# Patient Record
Sex: MALE | Race: WHITE | NOT HISPANIC OR LATINO | URBAN - METROPOLITAN AREA
[De-identification: names, ages, dates, MRNs, and addresses within clinical notes are randomized per-mention and may not be internally consistent; named-entity substitution may affect disease eponyms.]

---
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
## 2024-09-27 PROBLEM — S83.281A ACUTE LATERAL MENISCUS TEAR OF RIGHT KNEE: Status: ACTIVE | Noted: 2024-09-27

## 2025-03-05 ENCOUNTER — OFFICE VISIT (OUTPATIENT)
Dept: MEDICAL GROUP | Facility: LAB | Age: 20
End: 2025-03-05
Payer: COMMERCIAL

## 2025-03-05 VITALS
TEMPERATURE: 97.9 F | OXYGEN SATURATION: 97 % | HEIGHT: 69 IN | DIASTOLIC BLOOD PRESSURE: 64 MMHG | RESPIRATION RATE: 16 BRPM | WEIGHT: 233 LBS | HEART RATE: 93 BPM | BODY MASS INDEX: 34.51 KG/M2 | SYSTOLIC BLOOD PRESSURE: 104 MMHG

## 2025-03-05 DIAGNOSIS — Z13.6 SCREENING FOR CARDIOVASCULAR CONDITION: ICD-10-CM

## 2025-03-05 DIAGNOSIS — Z76.89 ENCOUNTER TO ESTABLISH CARE: ICD-10-CM

## 2025-03-05 DIAGNOSIS — L21.0 DANDRUFF: ICD-10-CM

## 2025-03-05 DIAGNOSIS — Z13.29 SCREENING FOR HYPOTHYROIDISM: ICD-10-CM

## 2025-03-05 PROCEDURE — 3074F SYST BP LT 130 MM HG: CPT | Performed by: FAMILY MEDICINE

## 2025-03-05 PROCEDURE — 3078F DIAST BP <80 MM HG: CPT | Performed by: FAMILY MEDICINE

## 2025-03-05 PROCEDURE — 99385 PREV VISIT NEW AGE 18-39: CPT | Performed by: FAMILY MEDICINE

## 2025-03-05 PROCEDURE — 99213 OFFICE O/P EST LOW 20 MIN: CPT | Mod: 25 | Performed by: FAMILY MEDICINE

## 2025-03-05 RX ORDER — NEOMYCIN SULFATE, POLYMYXIN B SULFATE, HYDROCORTISONE 3.5; 10000; 1 MG/ML; [USP'U]/ML; MG/ML
SOLUTION/ DROPS AURICULAR (OTIC)
COMMUNITY
End: 2025-03-05

## 2025-03-05 ASSESSMENT — ENCOUNTER SYMPTOMS
INSOMNIA: 0
WHEEZING: 0
BLURRED VISION: 0
CONSTIPATION: 0
SHORTNESS OF BREATH: 0
DIARRHEA: 0
PALPITATIONS: 0
ABDOMINAL PAIN: 0

## 2025-03-05 ASSESSMENT — PATIENT HEALTH QUESTIONNAIRE - PHQ9: CLINICAL INTERPRETATION OF PHQ2 SCORE: 0

## 2025-03-05 NOTE — PROGRESS NOTES
Verbal consent was acquired by the patient to use [employer] ambient listening note generation during this visit Yes    Subjective:   Isiah Richter is a 19 y.o. male here today for   Chief Complaint   Patient presents with    Annual Exam    Establish Care     History of Present Illness  The patient is a 19-year-old male who presents today to establish care. He has no significant history other than an acute right meniscus injury, status post repair in fall 2024.    He reports satisfactory progress with his right knee following surgical intervention. He underwent physical therapy post-surgery and experiences intermittent pain, which he considers normal. No new symptoms or exacerbation of existing ones are reported. His last orthopedic consultation was on 04/01/2025.    He has been informed by his hair salon about severe dandruff, which has not improved with the use of Miracle Oil shampoo. He has not tried Head and Shoulders or Selsun Blue shampoos. He uses high-powered air compression to clear the dandruff.    He reports no concerns related to diet or exercise. 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 He abstains from alcohol and recreational drug use but admits to using nicotine pouches, consuming approximately a quarter can per day. He had a dental check-up last week. He reports no new sexual partners or concerns for STD screening. He reports no visual or auditory issues, chest pain, shortness of breath, respiratory difficulties, chronic abdominal pain, diarrhea, or constipation. He occasionally experiences sleep disturbances, sleeping between 5 to 7 hours per night, and often wakes up feeling unrefreshed and tired. He experiences headaches twice a week upon waking. He does not snore and has not noticed any episodes of apnea. He typically showers before bedtime. He reports no concerns for depression or anxiety but admits to feeling stressed due to mechanical issues with his  "trucks. He has not received the influenza vaccine this year. He had a tonsillectomy over 10 years ago.    SOCIAL HISTORY  He does not drink alcohol. He uses nicotine patches and estimates using a quarter of a can per day. He does not use recreational drugs.    MEDICATIONS  Advil, Tylenol    IMMUNIZATIONS  He has not received the influenza vaccine this year.      No Known Allergies      Current medicines (including changes today)  No current outpatient medications on file.     No current facility-administered medications for this visit.     He  has no past medical history on file.    ROS   Review of Systems   HENT:  Negative for hearing loss.    Eyes:  Negative for blurred vision.   Respiratory:  Negative for shortness of breath and wheezing.    Cardiovascular:  Negative for chest pain and palpitations.   Gastrointestinal:  Negative for abdominal pain, constipation and diarrhea.   Psychiatric/Behavioral:  The patient does not have insomnia.    -See HPI     Objective:     Physical Exam:  /64   Pulse 93   Temp 36.6 °C (97.9 °F) (Temporal)   Resp 16   Ht 1.74 m (5' 8.5\")   Wt 106 kg (233 lb)   SpO2 97%  Body mass index is 34.91 kg/m².   Constitutional: Alert, no distress.  Skin: Warm, dry, good turgor, no rashes in visible areas.  Eye: Equal, round and reactive, conjunctiva clear, lids normal.  ENMT: TM's clear bilaterally, lips without lesions, good dentition, oropharynx clear.  Neck: Trachea midline, no masses, no thyromegaly. No cervical or supraclavicular lymphadenopathy.  Respiratory: Unlabored respiratory effort, lungs clear to auscultation, no wheezes, no rhonchi.  Cardiovascular: Normal S1, S2, no murmur, no edema.  Abdomen: Soft, non-tender, no masses, no hepatosplenomegaly.  Psych: Alert and oriented x3, normal affect and mood.    Results      Assessment and Plan:     Assessment & Plan  1. Post-operative status following right meniscus repair.  He reports occasional pain, which is considered " normal. He is advised to continue strengthening exercises for the knee and to maintain a weekly exercise regimen of 100 minutes, including activities such as running, jogging, or weightlifting. He should report any changes or new pain.    2. Dandruff.  He is advised to use Neutrogena T-Gel shampoo 2 to 3 times per week, applying it to the scalp, leaving it for a minute, and then rinsing it out. If there is no improvement after a month, a prescription for ketoconazole shampoo will be considered.    3. Establish care   -All questions concerns were answered at this time.  -Vaccinations/screenings needed at this time: Patient declines influenza vaccine  -Labs reviewed, N/A, check labs including lipid profile, TSH.  -Discussed the importance of a healthy, Mediterranean style diet, routine exercise regimen.  -Discussed general safety measures including seatbelts, helmets, avoidance of smoking, sunscreen, hydration.  -Follow-up for general physical exam on a yearly basis, sooner if needed.  He is advised to avoid nicotine use after 3 PM to prevent potential sleep disturbances. He is encouraged to maintain a regular sleep schedule and incorporate moderate exercise into his routine. He is also advised to establish a consistent bedtime routine, such as taking a mild walk or shower an hour before bedtime. A comprehensive panel of fasting blood tests will be ordered to ensure overall health. He is advised to follow up with the dentist regularly.    Follow-up  The patient will follow up in 1 year or sooner if any questions or concerns arise.      Orders:  1. Dandruff    2. Encounter to establish care  - CBC WITHOUT DIFFERENTIAL; Future  - Comp Metabolic Panel; Future    3. Screening for cardiovascular condition  - Lipid Profile; Future    4. Screening for hypothyroidism  - TSH WITH REFLEX TO FT4; Future      Followup: No follow-ups on file.         PLEASE NOTE: This dictation was created using voice recognition and [employer]  ambient listening software. I have made every reasonable attempt to correct obvious errors, but I expect that there are errors of grammar and possibly content that I did not discover before finalizing the note.

## 2025-03-29 LAB
ALBUMIN SERPL-MCNC: 4.7 G/DL (ref 4.3–5.2)
ALP SERPL-CCNC: 63 IU/L (ref 51–125)
ALT SERPL-CCNC: 20 IU/L (ref 0–44)
AST SERPL-CCNC: 16 IU/L (ref 0–40)
BILIRUB SERPL-MCNC: 0.4 MG/DL (ref 0–1.2)
BUN SERPL-MCNC: 9 MG/DL (ref 6–20)
BUN/CREAT SERPL: 11 (ref 9–20)
CALCIUM SERPL-MCNC: 9.8 MG/DL (ref 8.7–10.2)
CHLORIDE SERPL-SCNC: 105 MMOL/L (ref 96–106)
CHOLEST SERPL-MCNC: 141 MG/DL (ref 100–199)
CO2 SERPL-SCNC: 21 MMOL/L (ref 20–29)
CREAT SERPL-MCNC: 0.82 MG/DL (ref 0.76–1.27)
EGFRCR SERPLBLD CKD-EPI 2021: 129 ML/MIN/1.73
ERYTHROCYTE [DISTWIDTH] IN BLOOD BY AUTOMATED COUNT: 13.4 % (ref 11.6–15.4)
GLOBULIN SER CALC-MCNC: 2.8 G/DL (ref 1.5–4.5)
GLUCOSE SERPL-MCNC: 84 MG/DL (ref 70–99)
HCT VFR BLD AUTO: 49.9 % (ref 37.5–51)
HDLC SERPL-MCNC: 34 MG/DL
HGB BLD-MCNC: 16.5 G/DL (ref 13–17.7)
LDL CALC COMMENT:: ABNORMAL
LDLC SERPL CALC-MCNC: 90 MG/DL (ref 0–99)
MCH RBC QN AUTO: 28.3 PG (ref 26.6–33)
MCHC RBC AUTO-ENTMCNC: 33.1 G/DL (ref 31.5–35.7)
MCV RBC AUTO: 85 FL (ref 79–97)
NRBC BLD AUTO-RTO: ABNORMAL %
PLATELET # BLD AUTO: 230 X10E3/UL (ref 150–450)
POTASSIUM SERPL-SCNC: 4.5 MMOL/L (ref 3.5–5.2)
PROT SERPL-MCNC: 7.5 G/DL (ref 6–8.5)
RBC # BLD AUTO: 5.84 X10E6/UL (ref 4.14–5.8)
SODIUM SERPL-SCNC: 143 MMOL/L (ref 134–144)
TRIGL SERPL-MCNC: 88 MG/DL (ref 0–149)
TSH SERPL DL<=0.005 MIU/L-ACNC: 0.95 UIU/ML (ref 0.45–4.5)
VLDLC SERPL CALC-MCNC: 17 MG/DL (ref 5–40)
WBC # BLD AUTO: 4.8 X10E3/UL (ref 3.4–10.8)

## 2025-03-30 ENCOUNTER — RESULTS FOLLOW-UP (OUTPATIENT)
Dept: MEDICAL GROUP | Facility: LAB | Age: 20
End: 2025-03-30

## 2025-04-23 ENCOUNTER — OFFICE VISIT (OUTPATIENT)
Dept: MEDICAL GROUP | Facility: LAB | Age: 20
End: 2025-04-23
Payer: COMMERCIAL

## 2025-04-23 ENCOUNTER — HOSPITAL ENCOUNTER (OUTPATIENT)
Dept: LAB | Facility: MEDICAL CENTER | Age: 20
End: 2025-04-23
Attending: FAMILY MEDICINE
Payer: COMMERCIAL

## 2025-04-23 VITALS
SYSTOLIC BLOOD PRESSURE: 110 MMHG | WEIGHT: 205 LBS | OXYGEN SATURATION: 97 % | BODY MASS INDEX: 30.36 KG/M2 | HEIGHT: 69 IN | HEART RATE: 75 BPM | TEMPERATURE: 98 F | RESPIRATION RATE: 16 BRPM | DIASTOLIC BLOOD PRESSURE: 68 MMHG

## 2025-04-23 DIAGNOSIS — R10.84 GENERALIZED ABDOMINAL PAIN: ICD-10-CM

## 2025-04-23 DIAGNOSIS — Z13.6 SCREENING FOR CARDIOVASCULAR CONDITION: ICD-10-CM

## 2025-04-23 DIAGNOSIS — R63.4 WEIGHT LOSS: ICD-10-CM

## 2025-04-23 DIAGNOSIS — K92.1 BLOOD IN STOOL: ICD-10-CM

## 2025-04-23 DIAGNOSIS — Z13.29 SCREENING FOR HYPOTHYROIDISM: ICD-10-CM

## 2025-04-23 LAB
ALBUMIN SERPL BCP-MCNC: 4.5 G/DL (ref 3.2–4.9)
ALBUMIN/GLOB SERPL: 1.7 G/DL
ALP SERPL-CCNC: 62 U/L (ref 30–99)
ALT SERPL-CCNC: 17 U/L (ref 2–50)
ANION GAP SERPL CALC-SCNC: 13 MMOL/L (ref 7–16)
AST SERPL-CCNC: 16 U/L (ref 12–45)
BASOPHILS # BLD AUTO: 1 % (ref 0–1.8)
BASOPHILS # BLD: 0.07 K/UL (ref 0–0.12)
BILIRUB SERPL-MCNC: 0.5 MG/DL (ref 0.1–1.5)
BUN SERPL-MCNC: 10 MG/DL (ref 8–22)
CALCIUM ALBUM COR SERPL-MCNC: 9.1 MG/DL (ref 8.5–10.5)
CALCIUM SERPL-MCNC: 9.5 MG/DL (ref 8.5–10.5)
CHLORIDE SERPL-SCNC: 105 MMOL/L (ref 96–112)
CHOLEST SERPL-MCNC: 129 MG/DL (ref 100–199)
CO2 SERPL-SCNC: 23 MMOL/L (ref 20–33)
CREAT SERPL-MCNC: 0.88 MG/DL (ref 0.5–1.4)
EOSINOPHIL # BLD AUTO: 0.05 K/UL (ref 0–0.51)
EOSINOPHIL NFR BLD: 0.7 % (ref 0–6.9)
ERYTHROCYTE [DISTWIDTH] IN BLOOD BY AUTOMATED COUNT: 44.3 FL (ref 35.9–50)
GFR SERPLBLD CREATININE-BSD FMLA CKD-EPI: 126 ML/MIN/1.73 M 2
GLOBULIN SER CALC-MCNC: 2.6 G/DL (ref 1.9–3.5)
GLUCOSE SERPL-MCNC: 85 MG/DL (ref 65–99)
HCT VFR BLD AUTO: 49.3 % (ref 42–52)
HDLC SERPL-MCNC: 41 MG/DL
HGB BLD-MCNC: 16.5 G/DL (ref 14–18)
IMM GRANULOCYTES # BLD AUTO: 0.01 K/UL (ref 0–0.11)
IMM GRANULOCYTES NFR BLD AUTO: 0.1 % (ref 0–0.9)
LDLC SERPL CALC-MCNC: 75 MG/DL
LYMPHOCYTES # BLD AUTO: 1.22 K/UL (ref 1–4.8)
LYMPHOCYTES NFR BLD: 17.1 % (ref 22–41)
MCH RBC QN AUTO: 28.5 PG (ref 27–33)
MCHC RBC AUTO-ENTMCNC: 33.5 G/DL (ref 32.3–36.5)
MCV RBC AUTO: 85.3 FL (ref 81.4–97.8)
MONOCYTES # BLD AUTO: 0.84 K/UL (ref 0–0.85)
MONOCYTES NFR BLD AUTO: 11.8 % (ref 0–13.4)
NEUTROPHILS # BLD AUTO: 4.93 K/UL (ref 1.82–7.42)
NEUTROPHILS NFR BLD: 69.3 % (ref 44–72)
NRBC # BLD AUTO: 0 K/UL
NRBC BLD-RTO: 0 /100 WBC (ref 0–0.2)
PLATELET # BLD AUTO: 221 K/UL (ref 164–446)
PMV BLD AUTO: 9.9 FL (ref 9–12.9)
POTASSIUM SERPL-SCNC: 4.4 MMOL/L (ref 3.6–5.5)
PROT SERPL-MCNC: 7.1 G/DL (ref 6–8.2)
RBC # BLD AUTO: 5.78 M/UL (ref 4.7–6.1)
SODIUM SERPL-SCNC: 141 MMOL/L (ref 135–145)
TRIGL SERPL-MCNC: 67 MG/DL (ref 0–149)
TSH SERPL DL<=0.005 MIU/L-ACNC: 1.04 UIU/ML (ref 0.38–5.33)
WBC # BLD AUTO: 7.1 K/UL (ref 4.8–10.8)

## 2025-04-23 PROCEDURE — 3078F DIAST BP <80 MM HG: CPT | Performed by: FAMILY MEDICINE

## 2025-04-23 PROCEDURE — 36415 COLL VENOUS BLD VENIPUNCTURE: CPT

## 2025-04-23 PROCEDURE — 99214 OFFICE O/P EST MOD 30 MIN: CPT | Performed by: FAMILY MEDICINE

## 2025-04-23 PROCEDURE — 80061 LIPID PANEL: CPT

## 2025-04-23 PROCEDURE — 80053 COMPREHEN METABOLIC PANEL: CPT

## 2025-04-23 PROCEDURE — 85025 COMPLETE CBC W/AUTO DIFF WBC: CPT

## 2025-04-23 PROCEDURE — 3074F SYST BP LT 130 MM HG: CPT | Performed by: FAMILY MEDICINE

## 2025-04-23 PROCEDURE — 84443 ASSAY THYROID STIM HORMONE: CPT

## 2025-04-23 ASSESSMENT — FIBROSIS 4 INDEX: FIB4 SCORE: 0.31

## 2025-04-23 NOTE — PROGRESS NOTES
"Verbal consent was acquired by the patient to use [employer] ambient listening note generation during this visit Yes    Subjective:   Isiah Richter is a 19 y.o. male here today for   Chief Complaint   Patient presents with    GI Problem     History of Present Illness  The patient presents with gastrointestinal complaints.    He has been experiencing abdominal discomfort, described as cramping and aching, for approximately one month. The pain, which is rated as a 7 on a scale of 1 to 10, is localized to the stomach and sides, with no radiation to the back or chest. Occasional sharp pains occur once or twice daily. No correlation between symptoms and specific foods is noted, and heartburn is not experienced. There are no reports of fevers, chills, or night sweats. Physical activity remains unchanged, and no bulging around the umbilical area is observed.    Blood in the stools is reported every two days, a symptom not previously experienced. The blood is described as a clear, mucous-like substance with streaks of blood. Constipation or straining during bowel movements is not experienced, and bowel movements occur daily. Appetite has decreased, and excessive sleeping due to fatigue is noted. No pain during bowel movements is reported.    A weight loss of approximately 20 pounds over the past month is noted, coinciding with the onset of gastrointestinal symptoms. Weight has decreased from 233 to 205 pounds.    FAMILY HISTORY  - No family history of similar symptoms      No Known Allergies      Current medicines (including changes today)  No current outpatient medications on file.     No current facility-administered medications for this visit.     He  has no past medical history on file.    ROS   ROS  -See HPI     Objective:     Physical Exam:  /68   Pulse 75   Temp 36.7 °C (98 °F) (Temporal)   Resp 16   Ht 1.74 m (5' 8.5\")   Wt 93 kg (205 lb)   SpO2 97%  Body mass index is 30.71 kg/m². "   Constitutional: Alert, no distress.  Skin: Warm, dry, good turgor, no rashes in visible areas.  Eye: Equal, round and reactive, conjunctiva clear, lids normal.  Respiratory: Unlabored respiratory effort  Abdomen: Soft, slight generalized tenderness.  Focal region.  No rebound pain, no guarding.  No peritoneal signs.  No masses, no hepatosplenomegaly.  Bowel sounds normal  Psych: Alert and oriented x3, normal affect and mood.    Results      Assessment and Plan:     Assessment & Plan  1. Abdominal pain  Uncertain etiology at this time, prognosis uncertain.  Symptoms include cramping and sharp pain in the stomach and sides, rated 7/10 on some days. Physical examination revealed tenderness around the belly button area.  Diagnostic plan: Basic laboratory tests will be conducted to rule out anemia and potential bleeding. An ultrasound of the abdomen will be ordered to assess liver and gallbladder functions.  Treatment plan: Advised to maintain adequate hydration, consume a high-fiber diet, and adhere to a regular eating schedule. Monitor symptoms closely and report any changes.    2. Hematochezia  Presence of blood in the stool, occurring approximately once every 2 days, with occasional red water and clear mucous with blood. No history of blood in the stool prior to this episode.  Diagnostic plan: Referral to a gastroenterologist for a colonoscopy to evaluate the colon and ensure there are no underlying issues.  Treatment plan: Monitor symptoms closely and report any changes.    3. Weight loss  Weight loss of approximately 28 pounds over the past month, potentially related to gastrointestinal issues.  Diagnostic plan: Basic laboratory tests will be conducted to rule out anemia and potential bleeding. An ultrasound of the abdomen will be ordered to assess liver and gallbladder functions.  Treatment plan: Advised to maintain adequate hydration, consume a high-fiber diet, and adhere to a regular eating schedule. Monitor  symptoms closely and report any changes.    Orders:  1. Generalized abdominal pain  - CBC WITH DIFFERENTIAL; Future  - US-ABDOMEN COMPLETE SURVEY; Future  - Referral to GI for Colonoscopy  - Comp Metabolic Panel; Future    2. Blood in stool  - CBC WITH DIFFERENTIAL; Future  - Referral to GI for Colonoscopy    3. Weight loss  - CBC WITH DIFFERENTIAL; Future  - US-ABDOMEN COMPLETE SURVEY; Future  - Referral to GI for Colonoscopy  - Comp Metabolic Panel; Future      Followup: No follow-ups on file.         PLEASE NOTE: This dictation was created using voice recognition and [employer] ambient listening software. I have made every reasonable attempt to correct obvious errors, but I expect that there are errors of grammar and possibly content that I did not discover before finalizing the note.

## 2025-04-24 ENCOUNTER — RESULTS FOLLOW-UP (OUTPATIENT)
Dept: MEDICAL GROUP | Facility: LAB | Age: 20
End: 2025-04-24

## 2025-04-30 NOTE — Clinical Note
REFERRAL APPROVAL NOTICE         Sent on April 30, 2025                   Isiah Richter  [id number]  [facility] [address]                   Dear Mr. Richter,    After a careful review of the medical information and benefit coverage, [facility] has processed your referral. See below for additional details.    If applicable, you must be actively enrolled with your insurance for coverage of the authorized service. If you have any questions regarding your coverage, please contact your insurance directly.    REFERRAL INFORMATION   Referral #:  [id number]  Referred-To Provider    Referred-By Provider:  Gastroenterology    Marcel Barry M.D.   [facility]      [address] [facility] [address]  [place] [id number] [phone] [address] [place] [id number] [phone]    Referral Start Date:  04/23/2025  Referral End Date:   04/23/2026             SCHEDULING  If you do not already have an appointment, please call [phone] to make an appointment.     MORE INFORMATION  If you do not already have a [employer] account, sign up at: [email or web address]  You can access your medical information, make appointments, see lab results, billing information, and more.  If you have questions regarding this referral, please contact  the [facility] Referrals department at:             [phone]. Monday - Friday 8:00AM - 5:00PM.     Sincerely,    [facility]

## 2025-05-20 ENCOUNTER — HOSPITAL ENCOUNTER (OUTPATIENT)
Dept: RADIOLOGY | Facility: MEDICAL CENTER | Age: 20
End: 2025-05-20
Attending: FAMILY MEDICINE
Payer: COMMERCIAL

## 2025-05-20 DIAGNOSIS — R63.4 WEIGHT LOSS: ICD-10-CM

## 2025-05-20 DIAGNOSIS — R10.84 GENERALIZED ABDOMINAL PAIN: ICD-10-CM

## 2025-05-20 PROCEDURE — 76700 US EXAM ABDOM COMPLETE: CPT

## 2025-05-21 ENCOUNTER — RESULTS FOLLOW-UP (OUTPATIENT)
Dept: MEDICAL GROUP | Facility: LAB | Age: 20
End: 2025-05-21

## 2025-06-16 ENCOUNTER — HOSPITAL ENCOUNTER (OUTPATIENT)
Dept: RADIOLOGY | Facility: MEDICAL CENTER | Age: 20
End: 2025-06-16
Payer: COMMERCIAL

## 2025-06-16 DIAGNOSIS — R10.9 ABDOMINAL PAIN, RECURRENT: ICD-10-CM

## 2025-06-16 PROCEDURE — 74160 CT ABDOMEN W/CONTRAST: CPT

## 2025-06-16 PROCEDURE — 700117 HCHG RX CONTRAST REV CODE 255

## 2025-06-16 RX ADMIN — IOHEXOL 25 ML: 240 INJECTION, SOLUTION INTRATHECAL; INTRAVASCULAR; INTRAVENOUS; ORAL at 09:19

## 2025-06-16 RX ADMIN — IOHEXOL 100 ML: 350 INJECTION, SOLUTION INTRAVENOUS at 09:19
